# Patient Record
Sex: MALE | Race: WHITE | Employment: UNEMPLOYED | URBAN - METROPOLITAN AREA
[De-identification: names, ages, dates, MRNs, and addresses within clinical notes are randomized per-mention and may not be internally consistent; named-entity substitution may affect disease eponyms.]

---

## 2022-11-25 ENCOUNTER — HOSPITAL ENCOUNTER (EMERGENCY)
Age: 27
Discharge: HOME | End: 2022-11-25
Payer: COMMERCIAL

## 2022-11-25 VITALS
OXYGEN SATURATION: 100 % | TEMPERATURE: 97.2 F | HEART RATE: 68 BPM | SYSTOLIC BLOOD PRESSURE: 132 MMHG | DIASTOLIC BLOOD PRESSURE: 81 MMHG | BODY MASS INDEX: 28 KG/M2 | WEIGHT: 200 LBS | RESPIRATION RATE: 18 BRPM | HEIGHT: 71 IN

## 2022-11-25 DIAGNOSIS — S39.012A BACK STRAIN, INITIAL ENCOUNTER: Primary | ICD-10-CM

## 2022-11-25 PROCEDURE — 96372 THER/PROPH/DIAG INJ SC/IM: CPT

## 2022-11-25 PROCEDURE — 99211 OFF/OP EST MAY X REQ PHY/QHP: CPT

## 2022-11-25 PROCEDURE — 6360000002 HC RX W HCPCS: Performed by: NURSE PRACTITIONER

## 2022-11-25 RX ORDER — CYCLOBENZAPRINE HCL 10 MG
10 TABLET ORAL 2 TIMES DAILY PRN
Qty: 14 TABLET | Refills: 0 | Status: SHIPPED | OUTPATIENT
Start: 2022-11-25 | End: 2022-12-02

## 2022-11-25 RX ORDER — KETOROLAC TROMETHAMINE 30 MG/ML
30 INJECTION, SOLUTION INTRAMUSCULAR; INTRAVENOUS ONCE
Status: COMPLETED | OUTPATIENT
Start: 2022-11-25 | End: 2022-11-25

## 2022-11-25 RX ORDER — NAPROXEN 500 MG/1
500 TABLET ORAL 2 TIMES DAILY PRN
Qty: 14 TABLET | Refills: 0 | Status: SHIPPED | OUTPATIENT
Start: 2022-11-25 | End: 2022-12-02

## 2022-11-25 RX ORDER — MENTHOL 40 MG/ML
GEL TOPICAL
Qty: 1 EACH | Refills: 0 | Status: SHIPPED | OUTPATIENT
Start: 2022-11-25

## 2022-11-25 RX ADMIN — KETOROLAC TROMETHAMINE 30 MG: 30 INJECTION, SOLUTION INTRAMUSCULAR; INTRAVENOUS at 20:38

## 2022-11-25 ASSESSMENT — PAIN DESCRIPTION - LOCATION: LOCATION: BACK

## 2022-11-25 ASSESSMENT — PAIN SCALES - GENERAL: PAINLEVEL_OUTOF10: 8

## 2022-11-25 ASSESSMENT — PAIN - FUNCTIONAL ASSESSMENT: PAIN_FUNCTIONAL_ASSESSMENT: 0-10

## 2022-11-26 NOTE — ED PROVIDER NOTES
Department of Emergency Medicine  [address] [place] [address]  Provider Note  Admit Date/Time: 2022  8:21 PM  Room:   NAME: Luis Carlos Thompson  : 1995  MRN: [id number]     Chief Complaint:  Back Pain (Right flank area on back hurts hard to take a deep breath   no problem voiding)    History of Present Illness        Luis Carlos Thompson is a 24 y.o. male who has a past medical history of:   Past Medical History:   Diagnosis Date    ADHD (attention deficit hyperactivity disorder)     Anxiety     when younger    Varicella     presents to the urgent care center by private car for evaluation he woke up with pain in the right mid back area. He said he is not having any change in his urine hurts to move and change positions there was no fall there was no injury he just woke up like that. He denies any shortness of breath denies any anterior chest pain denies any urinary symptoms. Not coughing he is not sick he does not have any respiratory symptoms. ROS    Pertinent positives and negatives are stated within HPI, all other systems reviewed and are negative. Past Surgical History:   Procedure Laterality Date    APPENDECTOMY      FRACTURE SURGERY  ,    ankle l, l wrist    LAPAROSCOPIC APPENDECTOMY  11/19/15   Social History:  reports that he has been smoking cigarettes. He has been smoking an average of .25 packs per day. He uses smokeless tobacco. He reports that he does not drink alcohol and does not use drugs. Family History: family history is not on file. Allergies: Patient has no known allergies. Physical Exam   Oxygen Saturation Interpretation: Normal.   ED Triage Vitals [22]   BP Temp Temp src Heart Rate Resp SpO2 Height Weight   132/81 97.2 °F (36.2 °C) -- 68 18 100 % 5' 11\" (1.803 m) 200 lb (90.7 kg)       Physical Exam  Constitutional/General: Alert and oriented x3, well appearing, non toxic in NAD  HEENT:  NC/NT.   Neck: Supple, full ROM,   Respiratory: Lungs clear to auscultation bilaterally, no wheezes, rales, or rhonchi. Not in respiratory distress  CV:  Regular rate. Regular rhythm. GI:  Abdomen Soft, Non tender,   Musculoskeletal: Moves all extremities x 4. No midline thoracic or lumbar spinal tenderness. No paravertebral spinal muscle tenderness   Integument: skin warm and dry. No rashes. Lymphatic: no lymphadenopathy noted  Neurologic: GCS 15, no focal deficits, symmetric strength 5/5 in the upper and lower extremities bilaterally  Psychiatric: Normal Affect    Lab / Imaging Results   (All laboratory and radiology results have been personally reviewed by myself)  Labs:  No results found for this visit on 11/25/22. Imaging: All Radiology results interpreted by Radiologist unless otherwise noted. No orders to display       ED Course / Medical Decision Making     Medications   ketorolac (TORADOL) injection 30 mg (has no administration in time range)          MDM:   Just woke up like that with his back pain today. He is not short of breath he does not have a fever or cough. I do not suspect any pneumonia or respiratory problems. Not have any anterior chest pain there was no injury to his back he took some ibuprofen this morning but has not taken anything since . It is worse with movement and position changes. He said there is no change in his urine and the pain is worse with position changes so I do not suspect a kidney stone. He appears well he is in no distress at all with this. His sat is 100% his heart rate is 68 -- he is not tachycardic ,  he is PERC negative. so I do not suspect the PE or any lung involvement. Most likely  to be muscle strain I did give him Toradol IM, I did send him home with a prescription for Flexeril Biofreeze and Naprosyn. Advised if he develops further symptoms he should get reevaluated and he should follow-up with his PCP    PERC Rule for PE for Age <50:      Age ?  50 Negative     HR ? 100 Negative     O2 Sat on Room Air < 95% Negative     Prior History of DVT/PE Negative     Recent Trauma or Surgery Negative     Hemoptysis Negative     Exogenous Estrogen/Hormone Use Negative     Unilateral Extgremity Swelling Negative     * If ANY Criteria are positive, the PERC rule is not satisfied and cannot be used to rule out PE in this patient. Plan of Care/Counseling:  I reviewed today's visit with the patient in addition to providing specific details for the plan of care and counseling regarding the diagnosis and prognosis. Questions are answered at this time and are agreeable with the plan. Assessment      1. Back strain, initial encounter      Plan   Discharge to home and advised to contact No follow-up provider specified. Patient condition is good    New Medications     New Prescriptions    CYCLOBENZAPRINE (FLEXERIL) 10 MG TABLET    Take 1 tablet by mouth 2 times daily as needed for Muscle spasms    MENTHOL, TOPICAL ANALGESIC, (BIOFREEZE) 4 % GEL    Apply to painful areas on back BID prn    NAPROXEN (NAPROSYN) 500 MG TABLET    Take 1 tablet by mouth 2 times daily as needed for Pain     Electronically signed by AMPARO Balderas CNP   DD: 11/25/22  **This report was transcribed using voice recognition software. Every effort was made to ensure accuracy; however, inadvertent computerized transcription errors may be present.   END OF ED PROVIDER NOTE      AMPARO Balderas CNP  11/25/22 2039